# Patient Record
Sex: FEMALE | Race: WHITE | URBAN - METROPOLITAN AREA
[De-identification: names, ages, dates, MRNs, and addresses within clinical notes are randomized per-mention and may not be internally consistent; named-entity substitution may affect disease eponyms.]

---

## 2021-06-02 ENCOUNTER — TELEPHONE (OUTPATIENT)
Dept: OBGYN CLINIC | Facility: CLINIC | Age: 51
End: 2021-06-02

## 2021-06-02 NOTE — TELEPHONE ENCOUNTER
Dr. Key Valle reviewed medical records/referral sent from [address] [place] [address] on behalf of patient and approved to see Patient. Spoke to [facility] [address], [place] that Dr. Key Valle will accept to see patient. Mario Sharp says she will update the patient and have her schedule an appointment. Records are in folder at the .

## 2021-06-28 ENCOUNTER — TELEPHONE (OUTPATIENT)
Dept: OBGYN CLINIC | Facility: CLINIC | Age: 51
End: 2021-06-28

## 2021-06-28 ENCOUNTER — OFFICE VISIT (OUTPATIENT)
Dept: OBGYN CLINIC | Facility: CLINIC | Age: 51
End: 2021-06-28
Payer: COMMERCIAL

## 2021-06-28 VITALS — WEIGHT: 171 LBS | DIASTOLIC BLOOD PRESSURE: 86 MMHG | SYSTOLIC BLOOD PRESSURE: 144 MMHG | HEART RATE: 76 BPM

## 2021-06-28 DIAGNOSIS — D21.9 FIBROID: ICD-10-CM

## 2021-06-28 DIAGNOSIS — N94.6 DYSMENORRHEA: ICD-10-CM

## 2021-06-28 DIAGNOSIS — N92.0 MENORRHAGIA WITH REGULAR CYCLE: Primary | ICD-10-CM

## 2021-06-28 PROCEDURE — 99243 OFF/OP CNSLTJ NEW/EST LOW 30: CPT | Performed by: OBSTETRICS & GYNECOLOGY

## 2021-06-28 PROCEDURE — 3077F SYST BP >= 140 MM HG: CPT | Performed by: OBSTETRICS & GYNECOLOGY

## 2021-06-28 PROCEDURE — 3079F DIAST BP 80-89 MM HG: CPT | Performed by: OBSTETRICS & GYNECOLOGY

## 2021-06-28 NOTE — PROGRESS NOTES
HPI:    Patient ID: Kenroy Willingham is a 53year old year old female.     HPI  New patient gynecology consultation  Sent by Yogi Benton  53year-old  2 para 2 complains of heavy and painful menstrual periods progressively worsening with minora without lesions. Vagina- normal, no lesions or discharge. Moist and well supported. Bladder-  nontender. No masses. Normal support. No evidence of cystocele,  abnormal bladder neck mobility or evident urinary incontinence.   Cervix- smooth, no